# Patient Record
Sex: FEMALE | Race: WHITE | NOT HISPANIC OR LATINO | URBAN - METROPOLITAN AREA
[De-identification: names, ages, dates, MRNs, and addresses within clinical notes are randomized per-mention and may not be internally consistent; named-entity substitution may affect disease eponyms.]

---

## 2023-09-15 ENCOUNTER — OFFICE (OUTPATIENT)
Dept: URBAN - METROPOLITAN AREA CLINIC 76 | Facility: CLINIC | Age: 53
End: 2023-09-15

## 2023-09-15 VITALS
SYSTOLIC BLOOD PRESSURE: 110 MMHG | WEIGHT: 156 LBS | HEART RATE: 78 BPM | OXYGEN SATURATION: 98 % | DIASTOLIC BLOOD PRESSURE: 70 MMHG | HEIGHT: 66 IN

## 2023-09-15 DIAGNOSIS — R11.0 NAUSEA: ICD-10-CM

## 2023-09-15 DIAGNOSIS — K31.1 ADULT HYPERTROPHIC PYLORIC STENOSIS: ICD-10-CM

## 2023-09-15 PROCEDURE — 99204 OFFICE O/P NEW MOD 45 MIN: CPT | Performed by: INTERNAL MEDICINE

## 2024-01-22 RX ORDER — PANTOPRAZOLE SODIUM 40 MG/1
TABLET, DELAYED RELEASE ORAL
COMMUNITY

## 2024-01-22 RX ORDER — NAPROXEN 500 MG/1
500 TABLET ORAL 2 TIMES DAILY WITH MEALS
COMMUNITY

## 2024-01-22 RX ORDER — TOPIRAMATE 100 MG/1
100 TABLET, FILM COATED ORAL 2 TIMES DAILY
COMMUNITY

## 2024-01-22 RX ORDER — ZOLPIDEM TARTRATE 5 MG/1
5 TABLET ORAL NIGHTLY PRN
COMMUNITY

## 2024-01-22 RX ORDER — BUPROPION HYDROCHLORIDE 300 MG/1
300 TABLET ORAL DAILY
COMMUNITY
Start: 2023-11-07

## 2024-01-22 RX ORDER — VILAZODONE HYDROCHLORIDE 20 MG/1
20 TABLET ORAL DAILY
COMMUNITY
Start: 2023-12-29

## 2024-01-25 ENCOUNTER — OFFICE VISIT (OUTPATIENT)
Dept: SURGERY | Facility: CLINIC | Age: 54
End: 2024-01-25
Payer: COMMERCIAL

## 2024-01-25 VITALS
HEART RATE: 79 BPM | BODY MASS INDEX: 27.83 KG/M2 | HEIGHT: 64 IN | OXYGEN SATURATION: 99 % | DIASTOLIC BLOOD PRESSURE: 80 MMHG | WEIGHT: 163 LBS | SYSTOLIC BLOOD PRESSURE: 114 MMHG

## 2024-01-25 DIAGNOSIS — R11.0 NAUSEA: Primary | ICD-10-CM

## 2024-01-25 DIAGNOSIS — R93.3 ABNORMAL ENDOSCOPY OF UPPER GASTROINTESTINAL TRACT: ICD-10-CM

## 2024-01-25 PROCEDURE — 99203 OFFICE O/P NEW LOW 30 MIN: CPT | Performed by: SURGERY

## 2024-01-25 NOTE — PROGRESS NOTES
CC: Pyloric stenosis     HPI: Patient is a very pleasant 53-year-old female that is here today for evaluation of  persistent nausea and recent diagnosis of pyloric stenosis.  Patient report has been having nausea for approximately a year.  The nausea is persistent and usually not exacerbated by food intake.  She denies any vomiting but reports early satiety.  The patient took antinausea medication that has not been helping.  She reports she has been gaining weight but has not changed her diet.  The patient denies any heartburn, regurgitation or dysphagia.  The patient underwent upper endoscopy on 8/8/2023 and she was found to have pyloric stenosis.  This was dilated with through-the-scope balloon dilators up to 20 mm.  Patient reports no improvement of her symptoms after dilation.  She does not have history of H. pylori as per patient results of the KEVON test was negative.  She reports no starting any new medication before her persistent nausea started     PMH: Depression, colon polyps, pyloric stenosis, migraines, postoperative nausea vomiting.     PSH:   -Laparoscopic appendectomy 16 years ago  -Laparoscopic cholecystectomy  -IUD insertion  -Upper endoscopy colonoscopy 8/20/2023    MEDS: Bupropion, pantoprazole, Topamax, Viibryd, Ambien, naproxen     ALL: No known drug allergy    FH and SH: Family history is noncontributory.  Denies smoking drinking or taking any drugs     ROS:   Constitutional: denies any weight changes, fatigue or weakness.  HEENT: Denies hearing loss and rhinorrhea  Cardiovascular: denies chest pain, palpitations, edemas.  Respiratory: denies cough, sputum, SOB.  Gastrointestinal: Reports nausea abd pain, diarrhea, constipation.  Genitourinary: denies dysuria, frequency.  Endocrine: denies cold intolerance, lethargy and flushing.  Hem: denies excessive bruising and postop bleeding.  Musculoskeletal: denies weakness, joint swelling, pain or stiffness.  Neuro: denies seizures, CVA, paresthesia, or  CM received a vm from [facility] asking if a referral can be placed to [facility]  CM sent referral to [facility]  Awaiting response  "peripheral neuropathy.   Skin: denies change in nevi, rashes, masses.     PE:   Vitals:    01/25/24 0851   BP: 114/80   Pulse: 79   SpO2: 99%   Weight: 73.9 kg (163 lb)   Height: 162.6 cm (64\")     Body mass index is 27.98 kg/m².   Alert and oriented ×3, no acute distress.  Head is normocephalic and atraumatic.  Neck is supple there is no thyromegaly or lymphadenopathy  Chest is clear bilaterally there is no added sounds  Regular rate and rhythm, no murmurs  Abdomen is soft and nontender, is nondistended, bowel sounds are positive.  There is no rebound or guarding is and there is no peritoneal signs.  No clubbing cyanosis or edema in lower or upper extremities     Diagnostic studies:   Gastric emptying study 11/3/2023: Normal gastric emptying study    CT scan abdomen pelvis 12/4/2023 that was done in outside facility show moderate amount of stool throughout the colon, no other major abnormalities.  Evidence of prior cholecystectomy    Labs 5/8/2023:   Normal hemoglobin, white blood cell count 9.2, platelets 261, normal CMP     Assessment and plan    The patient is a very pleasant 53-year-old female with nausea without vomiting and pyloric stenosis seen on endoscopy without improvement of her symptoms after dilation.  Discussed with the patient about further step.  I think that she should undergo upper GI series for further evaluation.  If evidence of pyloric stenosis then she may benefit from pyloroplasty.    Diet as tolerated  I will call the patient whenever I have the results of her upper GI series     Vance Sanchez MD  General, Minimally Invasive and Endoscopic Surgery  [facility], operated by [facility]     [address] [place] [address], [address]  [place], [place], [address]  P: [phone]  F: [phone]    "